# Patient Record
Sex: MALE | Race: BLACK OR AFRICAN AMERICAN | NOT HISPANIC OR LATINO
[De-identification: names, ages, dates, MRNs, and addresses within clinical notes are randomized per-mention and may not be internally consistent; named-entity substitution may affect disease eponyms.]

---

## 2018-01-09 ENCOUNTER — APPOINTMENT (OUTPATIENT)
Dept: SURGERY | Facility: CLINIC | Age: 68
End: 2018-01-09
Payer: MEDICARE

## 2018-01-09 PROCEDURE — 99213 OFFICE O/P EST LOW 20 MIN: CPT

## 2018-02-26 ENCOUNTER — OTHER (OUTPATIENT)
Age: 68
End: 2018-02-26

## 2018-02-27 ENCOUNTER — OTHER (OUTPATIENT)
Age: 68
End: 2018-02-27

## 2018-07-17 ENCOUNTER — APPOINTMENT (OUTPATIENT)
Dept: SURGERY | Facility: CLINIC | Age: 68
End: 2018-07-17
Payer: MEDICARE

## 2018-07-17 PROCEDURE — 99213 OFFICE O/P EST LOW 20 MIN: CPT

## 2019-01-22 ENCOUNTER — APPOINTMENT (OUTPATIENT)
Dept: SURGERY | Facility: CLINIC | Age: 69
End: 2019-01-22
Payer: MEDICARE

## 2019-01-22 DIAGNOSIS — R22.0 LOCALIZED SWELLING, MASS AND LUMP, HEAD: ICD-10-CM

## 2019-01-22 PROCEDURE — 99213 OFFICE O/P EST LOW 20 MIN: CPT

## 2019-01-22 NOTE — PHYSICAL EXAM
[Laryngoscopy Performed] : laryngoscopy was performed, see procedure section for findings [Midline] : located in midline position [Normal] : orientation to person, place, and time: normal [FreeTextEntry2] : 5 cm left cheek mass, well circumscribed and mobile, adjacent to masseter

## 2019-01-22 NOTE — HISTORY OF PRESENT ILLNESS
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  denies pain, drainage, infection, dysphagia,.new lesions  or change in size. recent MRI stable. no changes medically since last visit with exception of bilateral hip replacement

## 2022-06-28 ENCOUNTER — APPOINTMENT (OUTPATIENT)
Dept: UROLOGY | Facility: CLINIC | Age: 72
End: 2022-06-28

## 2022-06-28 VITALS
WEIGHT: 259 LBS | HEIGHT: 71 IN | TEMPERATURE: 98.3 F | DIASTOLIC BLOOD PRESSURE: 84 MMHG | BODY MASS INDEX: 36.26 KG/M2 | HEART RATE: 84 BPM | SYSTOLIC BLOOD PRESSURE: 137 MMHG

## 2022-06-28 DIAGNOSIS — Z78.9 OTHER SPECIFIED HEALTH STATUS: ICD-10-CM

## 2022-06-28 PROCEDURE — 51798 US URINE CAPACITY MEASURE: CPT

## 2022-06-28 PROCEDURE — 99203 OFFICE O/P NEW LOW 30 MIN: CPT

## 2022-06-29 LAB — PSA SERPL-MCNC: 6.9 NG/ML

## 2022-06-29 NOTE — HISTORY OF PRESENT ILLNESS
[FreeTextEntry1] : : 1950 \par Referring Provider: none \par \par HPI: Mr. ANA MARIA FONG is a 72 year yo M with a PMHx notable for elevated PSA. Here today to establish care. No urinary complaints, nocturia x2. Urinary stream is strong. Feels he empties all the way out. No intermittency, but does occasionally feel he has to use the restroom again after voiding. No issues with erections. No history of UTI or hospitalizations related to . \par \par *Prefers me to call cell - under info\par \par Anticoagulation: None\par All: NKDA\par Social: 2 drinks a week,  1/4 ppd x 5 , quit in 70s, , children; worked for [place] [employer]\par PMHx: HTN, gout\par FHx: wife with dementia, no cancer\par PSHx: bilateral THR, L hand tendon surgery\par Labs: PSA up to 5.56 from OSH records\par \par Imaging:\par  [Urinary Incontinence] : no urinary incontinence [Urinary Retention] : no urinary retention [Urinary Urgency] : no urinary urgency [Urinary Frequency] : no urinary frequency

## 2022-06-29 NOTE — PHYSICAL EXAM
[General Appearance - Well Developed] : well developed [General Appearance - Well Nourished] : well nourished [Heart Rate And Rhythm] : Heart rate and rhythm were normal [] : no respiratory distress [Respiration, Rhythm And Depth] : normal respiratory rhythm and effort [Bowel Sounds] : normal bowel sounds [Abdomen Soft] : soft [Normal Station and Gait] : the gait and station were normal for the patient's age [Skin Color & Pigmentation] : normal skin color and pigmentation [Skin Turgor] : supple [No Focal Deficits] : no focal deficits [Oriented To Time, Place, And Person] : oriented to person, place, and time [Not Anxious] : not anxious [FreeTextEntry1] : ERIKA challenging, likely ~30-40g, no nodules

## 2022-06-29 NOTE — REVIEW OF SYSTEMS
[Negative] : Heme/Lymph [see HPI] : see HPI [Strong urge to urinate] : strong urge to urinate [FreeTextEntry2] : 2 x

## 2022-12-20 ENCOUNTER — APPOINTMENT (OUTPATIENT)
Dept: UROLOGY | Facility: CLINIC | Age: 72
End: 2022-12-20

## 2022-12-20 VITALS — DIASTOLIC BLOOD PRESSURE: 80 MMHG | RESPIRATION RATE: 17 BRPM | SYSTOLIC BLOOD PRESSURE: 122 MMHG | HEART RATE: 79 BPM

## 2022-12-20 PROCEDURE — 99213 OFFICE O/P EST LOW 20 MIN: CPT

## 2023-01-06 LAB — PSA SERPL-MCNC: 6.64 NG/ML

## 2023-01-07 ENCOUNTER — RESULT REVIEW (OUTPATIENT)
Age: 73
End: 2023-01-07

## 2023-01-07 ENCOUNTER — APPOINTMENT (OUTPATIENT)
Dept: MRI IMAGING | Facility: IMAGING CENTER | Age: 73
End: 2023-01-07
Payer: MEDICARE

## 2023-01-07 ENCOUNTER — OUTPATIENT (OUTPATIENT)
Dept: OUTPATIENT SERVICES | Facility: HOSPITAL | Age: 73
LOS: 1 days | End: 2023-01-07
Payer: MEDICARE

## 2023-01-07 DIAGNOSIS — R97.20 ELEVATED PROSTATE SPECIFIC ANTIGEN [PSA]: ICD-10-CM

## 2023-01-07 DIAGNOSIS — Z96.60 PRESENCE OF UNSPECIFIED ORTHOPEDIC JOINT IMPLANT: Chronic | ICD-10-CM

## 2023-01-07 PROCEDURE — 72197 MRI PELVIS W/O & W/DYE: CPT

## 2023-01-07 PROCEDURE — 76498P: CUSTOM | Mod: 26,MH

## 2023-01-07 PROCEDURE — A9585: CPT

## 2023-01-07 PROCEDURE — 72197 MRI PELVIS W/O & W/DYE: CPT | Mod: 26,MH

## 2023-01-07 PROCEDURE — 76498 UNLISTED MR PROCEDURE: CPT

## 2023-01-11 NOTE — PHYSICAL EXAM
[General Appearance - Well Developed] : well developed [General Appearance - Well Nourished] : well nourished [Abdomen Soft] : soft [] : no respiratory distress [Respiration, Rhythm And Depth] : normal respiratory rhythm and effort [Oriented To Time, Place, And Person] : oriented to person, place, and time [Not Anxious] : not anxious [Normal Station and Gait] : the gait and station were normal for the patient's age [No Focal Deficits] : no focal deficits [No Palpable Adenopathy] : no palpable adenopathy [Bowel Sounds] : normal bowel sounds [Skin Color & Pigmentation] : normal skin color and pigmentation [Skin Turgor] : supple [Heart Rate And Rhythm] : Heart rate and rhythm were normal [FreeTextEntry1] : ERIKA challenging, likely ~30-40g, no nodules

## 2023-01-11 NOTE — HISTORY OF PRESENT ILLNESS
[Nocturia] : nocturia [FreeTextEntry1] : : 1950 \par Referring Provider: none \par \par HPI: Mr. ANA MARIA FONG is a 72 year yo M with a PMHx notable for elevated PSA. Here today to establish care. No urinary complaints, nocturia x2. Urinary stream is strong. Feels he empties all the way out. No intermittency, but does occasionally feel he has to use the restroom again after voiding. No issues with erections. No history of UTI or hospitalizations related to . \par \par *Prefers me to call cell - under info\par \par Anticoagulation: None\par All: NKDA\par Social: 2 drinks a week, 1/4 ppd x 5 , quit in 70s, , children; worked for [place] [employer]\par PMHx: HTN, gout\par FHx: wife with dementia, no cancer\par PSHx: bilateral THR, L hand tendon surgery\par Labs: PSA up to 5.56 from OSH records\par \par Patient is currently experiencing no urinary incontinence, no urinary retention, no urinary urgency and no urinary frequency. \par  \par \par \par Here today for 6 month follow up and PSA check. Patient reports nocturia x2 related to fluid intake. It is not bothersome and the moment and he would not like any medical intervention at this time. Patient is currently experiencing no urinary incontinence, no urinary retention, no urinary urgency and no urinary frequency. Was supposed to get MRI for PSA of 6.9 in  but message relayed to wife with dementia, patient states he was not aware. Discussed importance of obtaining MRI.  [Urinary Incontinence] : no urinary incontinence [Urinary Retention] : no urinary retention [Urinary Urgency] : no urinary urgency [Urinary Frequency] : no urinary frequency

## 2023-01-12 ENCOUNTER — NON-APPOINTMENT (OUTPATIENT)
Age: 73
End: 2023-01-12

## 2023-01-24 ENCOUNTER — NON-APPOINTMENT (OUTPATIENT)
Age: 73
End: 2023-01-24

## 2023-01-30 ENCOUNTER — NON-APPOINTMENT (OUTPATIENT)
Age: 73
End: 2023-01-30

## 2023-01-31 ENCOUNTER — APPOINTMENT (OUTPATIENT)
Dept: UROLOGY | Facility: CLINIC | Age: 73
End: 2023-01-31
Payer: MEDICARE

## 2023-01-31 ENCOUNTER — OUTPATIENT (OUTPATIENT)
Dept: OUTPATIENT SERVICES | Facility: HOSPITAL | Age: 73
LOS: 1 days | End: 2023-01-31
Payer: MEDICARE

## 2023-01-31 VITALS
HEIGHT: 71 IN | BODY MASS INDEX: 35.98 KG/M2 | WEIGHT: 257 LBS | HEART RATE: 90 BPM | DIASTOLIC BLOOD PRESSURE: 83 MMHG | SYSTOLIC BLOOD PRESSURE: 149 MMHG

## 2023-01-31 DIAGNOSIS — R35.0 FREQUENCY OF MICTURITION: ICD-10-CM

## 2023-01-31 DIAGNOSIS — Z96.60 PRESENCE OF UNSPECIFIED ORTHOPEDIC JOINT IMPLANT: Chronic | ICD-10-CM

## 2023-01-31 PROCEDURE — 55700: CPT

## 2023-01-31 PROCEDURE — 76377 3D RENDER W/INTRP POSTPROCES: CPT | Mod: 26

## 2023-02-03 ENCOUNTER — APPOINTMENT (OUTPATIENT)
Dept: OTOLARYNGOLOGY | Facility: CLINIC | Age: 73
End: 2023-02-03
Payer: MEDICARE

## 2023-02-03 ENCOUNTER — NON-APPOINTMENT (OUTPATIENT)
Age: 73
End: 2023-02-03

## 2023-02-03 VITALS
SYSTOLIC BLOOD PRESSURE: 147 MMHG | TEMPERATURE: 97.9 F | HEART RATE: 87 BPM | HEIGHT: 70 IN | BODY MASS INDEX: 36.79 KG/M2 | WEIGHT: 257 LBS | DIASTOLIC BLOOD PRESSURE: 86 MMHG

## 2023-02-03 DIAGNOSIS — R97.20 ELEVATED PROSTATE SPECIFIC ANTIGEN [PSA]: ICD-10-CM

## 2023-02-03 DIAGNOSIS — H93.292 OTHER ABNORMAL AUDITORY PERCEPTIONS, LEFT EAR: ICD-10-CM

## 2023-02-03 PROCEDURE — 69210 REMOVE IMPACTED EAR WAX UNI: CPT

## 2023-02-03 PROCEDURE — 92567 TYMPANOMETRY: CPT

## 2023-02-03 PROCEDURE — 99203 OFFICE O/P NEW LOW 30 MIN: CPT | Mod: 25

## 2023-02-03 PROCEDURE — 92557 COMPREHENSIVE HEARING TEST: CPT

## 2023-02-03 NOTE — CONSULT LETTER
[Dear  ___] : Dear  [unfilled], [Consult Letter:] : I had the pleasure of evaluating your patient, [unfilled]. [Please see my note below.] : Please see my note below. [Consult Closing:] : Thank you very much for allowing me to participate in the care of this patient.  If you have any questions, please do not hesitate to contact me. [Sincerely,] : Sincerely, [FreeTextEntry3] : Hallie Varela MD\par Otolaryngology and Cranial Base Surgery\par Attending Physician - Department of Otolaryngology and Head & Neck Surgery\par [facility]\par  - [employer] [facility]\par Office: [phone]\par Fax: [phone]\par

## 2023-02-03 NOTE — HISTORY OF PRESENT ILLNESS
[de-identified] : 71 y/o M, notes one month ago had decreased hearing in the left ear.  No tinnitus or vertigo.  No pain.  Used cerumen remover gtts and notes had mild improvement.

## 2023-02-03 NOTE — END OF VISIT
[FreeTextEntry3] : I personally saw and examined Mr. ANA MARIA FONG in detail this visit today. I personally reviewed the HPI, PMH, FH, SH, ROS and medications/allergies. I have spoken to SILVANO Cornelius regarding the history and have personally determined the assessment and plan of care, and documented this myself. I was present and participated in all key portions of the encounter and all procedures noted above. I have made changes in the body of the note where appropriate.\par \par Attesting Faculty: See Attending Signature Below

## 2023-02-13 ENCOUNTER — TRANSCRIPTION ENCOUNTER (OUTPATIENT)
Age: 73
End: 2023-02-13

## 2023-02-16 ENCOUNTER — TRANSCRIPTION ENCOUNTER (OUTPATIENT)
Age: 73
End: 2023-02-16

## 2023-02-16 LAB — PROSTATE BIOPSY: NORMAL

## 2023-02-17 ENCOUNTER — TRANSCRIPTION ENCOUNTER (OUTPATIENT)
Age: 73
End: 2023-02-17

## 2023-02-21 ENCOUNTER — APPOINTMENT (OUTPATIENT)
Dept: UROLOGY | Facility: CLINIC | Age: 73
End: 2023-02-21
Payer: MEDICARE

## 2023-02-21 PROCEDURE — 99213 OFFICE O/P EST LOW 20 MIN: CPT

## 2023-02-21 NOTE — HISTORY OF PRESENT ILLNESS
[FreeTextEntry1] : : 1950 \par Referring Provider: none \par \par HPI: Mr. ANA MARIA FONG is a 72 year yo M with a PMHx notable for elevated PSA. Here today to establish care. No urinary complaints, nocturia x2. Urinary stream is strong. Feels he empties all the way out. No intermittency, but does occasionally feel he has to use the restroom again after voiding. No issues with erections. No history of UTI or hospitalizations related to . \par \par *Prefers me to call cell - under info\par \par Anticoagulation: None\par All: NKDA\par Social: 2 drinks a week, 1/4 ppd x 5 , quit in 70s, , children; worked for [place] [employer]\par PMHx: HTN, gout\par FHx: wife with dementia, no cancer\par PSHx: bilateral THR, L hand tendon surgery\par Labs: PSA up to 5.56 from OSH records\par \par Patient is currently experiencing no urinary incontinence, no urinary retention, no urinary urgency and no urinary frequency. \par  \par \par \par Here today for 6 month follow up and PSA check. Patient reports nocturia x2 related to fluid intake. It is not bothersome and the moment and he would not like any medical intervention at this time. Patient is currently experiencing no urinary incontinence, no urinary retention, no urinary urgency and no urinary frequency. Was supposed to get MRI for PSA of 6.9 in  but message relayed to wife with dementia, patient states he was not aware. Discussed importance of obtaining MRI. \par \par :\par Here today with stable nocturia. Prostate biopsy with 1 core 3+4 30% Pattern 4 disease. Discussed benefits including AS, XRT and surgery. He agrees that he would like to observe for now. No other symptoms at this time. No weight loss.  [Urinary Incontinence] : no urinary incontinence [Urinary Retention] : no urinary retention [Urinary Urgency] : no urinary urgency [Urinary Frequency] : no urinary frequency [Nocturia] : no nocturia

## 2023-05-23 ENCOUNTER — APPOINTMENT (OUTPATIENT)
Dept: UROLOGY | Facility: CLINIC | Age: 73
End: 2023-05-23
Payer: MEDICARE

## 2023-05-23 PROCEDURE — 99213 OFFICE O/P EST LOW 20 MIN: CPT

## 2023-05-27 LAB — PSA SERPL-MCNC: 7.77 NG/ML

## 2023-05-27 NOTE — ASSESSMENT
[FreeTextEntry1] : 74 yo M with BPH and CaP with GG2 disease, stable, due for MRI prostate in 3 months

## 2023-05-27 NOTE — HISTORY OF PRESENT ILLNESS
[FreeTextEntry1] : : 1950 \par Referring Provider: none \par \par HPI: Mr. ANA MARIA FONG is a 72 year yo M with a PMHx notable for elevated PSA. Here today to establish care. No urinary complaints, nocturia x2. Urinary stream is strong. Feels he empties all the way out. No intermittency, but does occasionally feel he has to use the restroom again after voiding. No issues with erections. No history of UTI or hospitalizations related to . \par \par *Prefers me to call cell - under info\par \par Anticoagulation: None\par All: NKDA\par Social: 2 drinks a week, 1/4 ppd x 5 , quit in 70s, , children; worked for [place] [employer]\par PMHx: HTN, gout\par FHx: wife with dementia, no cancer\par PSHx: bilateral THR, L hand tendon surgery\par Labs: PSA up to 5.56 from OSH records\par \par Patient is currently experiencing no urinary incontinence, no urinary retention, no urinary urgency and no urinary frequency. \par  \par \par \par Here today for 6 month follow up and PSA check. Patient reports nocturia x2 related to fluid intake. It is not bothersome and the moment and he would not like any medical intervention at this time. Patient is currently experiencing no urinary incontinence, no urinary retention, no urinary urgency and no urinary frequency. Was supposed to get MRI for PSA of 6.9 in  but message relayed to wife with dementia, patient states he was not aware. Discussed importance of obtaining MRI. \par \par :\par Here today with stable nocturia. Prostate biopsy with 1 core 3+4 30% Pattern 4 disease. Discussed benefits including AS, XRT and surgery. He agrees that he would like to observe for now. No other symptoms at this time. No weight loss. \par \par :\par Here today with 1 core GG2 CaP and 30% Pattern 4 disease. Discussed he would to continue AS at this time. Pending PSA Today, due for MRI in 3 months. No new symptoms, wife with worsening dementia.  [Urinary Incontinence] : no urinary incontinence [Urinary Retention] : no urinary retention [Urinary Urgency] : no urinary urgency [Urinary Frequency] : no urinary frequency [Nocturia] : no nocturia

## 2023-05-27 NOTE — PHYSICAL EXAM
[General Appearance - Well Developed] : well developed [General Appearance - Well Nourished] : well nourished [Bowel Sounds] : normal bowel sounds [Abdomen Soft] : soft [Skin Color & Pigmentation] : normal skin color and pigmentation [Skin Turgor] : supple [Heart Rate And Rhythm] : Heart rate and rhythm were normal [] : no respiratory distress [Respiration, Rhythm And Depth] : normal respiratory rhythm and effort [Oriented To Time, Place, And Person] : oriented to person, place, and time [Not Anxious] : not anxious [Normal Station and Gait] : the gait and station were normal for the patient's age [No Focal Deficits] : no focal deficits [No Palpable Adenopathy] : no palpable adenopathy [FreeTextEntry1] : ERIKA challenging, likely ~30-40g, no nodules

## 2023-08-09 ENCOUNTER — APPOINTMENT (OUTPATIENT)
Dept: MRI IMAGING | Facility: IMAGING CENTER | Age: 73
End: 2023-08-09

## 2023-09-01 NOTE — ASSESSMENT
Problem: Infection  Goal: Absence of Infection Signs and Symptoms  Outcome: Ongoing, Progressing     Problem: Adult Inpatient Plan of Care  Goal: Plan of Care Review  Outcome: Ongoing, Progressing  Goal: Patient-Specific Goal (Individualized)  Outcome: Ongoing, Progressing  Goal: Absence of Hospital-Acquired Illness or Injury  Outcome: Ongoing, Progressing  Goal: Optimal Comfort and Wellbeing  Outcome: Ongoing, Progressing  Goal: Readiness for Transition of Care  Outcome: Ongoing, Progressing      [FreeTextEntry1] : Abnormal auditory perception of left ear, mild cerumen:\par - Cerumen removed in office today\par - Audiogram today with mild HF SNHL\par - f/u yearly cerumen and audio

## 2023-09-06 ENCOUNTER — RESULT REVIEW (OUTPATIENT)
Age: 73
End: 2023-09-06

## 2023-09-06 ENCOUNTER — APPOINTMENT (OUTPATIENT)
Dept: MRI IMAGING | Facility: CLINIC | Age: 73
End: 2023-09-06
Payer: MEDICARE

## 2023-09-06 ENCOUNTER — OUTPATIENT (OUTPATIENT)
Dept: OUTPATIENT SERVICES | Facility: HOSPITAL | Age: 73
LOS: 1 days | End: 2023-09-06
Payer: MEDICARE

## 2023-09-06 DIAGNOSIS — R97.20 ELEVATED PROSTATE SPECIFIC ANTIGEN [PSA]: ICD-10-CM

## 2023-09-06 DIAGNOSIS — Z96.60 PRESENCE OF UNSPECIFIED ORTHOPEDIC JOINT IMPLANT: Chronic | ICD-10-CM

## 2023-09-06 PROCEDURE — 76498P: CUSTOM | Mod: 26,MH

## 2023-09-06 PROCEDURE — 72197 MRI PELVIS W/O & W/DYE: CPT

## 2023-09-06 PROCEDURE — 76498 UNLISTED MR PROCEDURE: CPT

## 2023-09-06 PROCEDURE — 72197 MRI PELVIS W/O & W/DYE: CPT | Mod: 26,MH

## 2023-09-06 PROCEDURE — A9585: CPT

## 2023-09-12 ENCOUNTER — APPOINTMENT (OUTPATIENT)
Dept: UROLOGY | Facility: CLINIC | Age: 73
End: 2023-09-12
Payer: MEDICARE

## 2023-09-12 PROCEDURE — 99213 OFFICE O/P EST LOW 20 MIN: CPT | Mod: 95

## 2023-09-17 ENCOUNTER — TRANSCRIPTION ENCOUNTER (OUTPATIENT)
Age: 73
End: 2023-09-17

## 2023-12-12 ENCOUNTER — APPOINTMENT (OUTPATIENT)
Dept: UROLOGY | Facility: CLINIC | Age: 73
End: 2023-12-12
Payer: MEDICARE

## 2023-12-12 VITALS
RESPIRATION RATE: 16 BRPM | TEMPERATURE: 98.2 F | DIASTOLIC BLOOD PRESSURE: 75 MMHG | SYSTOLIC BLOOD PRESSURE: 138 MMHG | HEART RATE: 76 BPM

## 2023-12-12 PROCEDURE — 99213 OFFICE O/P EST LOW 20 MIN: CPT

## 2023-12-31 ENCOUNTER — TRANSCRIPTION ENCOUNTER (OUTPATIENT)
Age: 73
End: 2023-12-31

## 2023-12-31 LAB — PSA SERPL-MCNC: 7.34 NG/ML

## 2024-01-25 ENCOUNTER — APPOINTMENT (OUTPATIENT)
Dept: UROLOGY | Facility: CLINIC | Age: 74
End: 2024-01-25
Payer: MEDICARE

## 2024-01-25 ENCOUNTER — OUTPATIENT (OUTPATIENT)
Dept: OUTPATIENT SERVICES | Facility: HOSPITAL | Age: 74
LOS: 1 days | End: 2024-01-25
Payer: MEDICARE

## 2024-01-25 VITALS — DIASTOLIC BLOOD PRESSURE: 83 MMHG | HEART RATE: 90 BPM | SYSTOLIC BLOOD PRESSURE: 123 MMHG

## 2024-01-25 VITALS — SYSTOLIC BLOOD PRESSURE: 162 MMHG | HEART RATE: 84 BPM | DIASTOLIC BLOOD PRESSURE: 90 MMHG

## 2024-01-25 DIAGNOSIS — R35.0 FREQUENCY OF MICTURITION: ICD-10-CM

## 2024-01-25 DIAGNOSIS — Z96.60 PRESENCE OF UNSPECIFIED ORTHOPEDIC JOINT IMPLANT: Chronic | ICD-10-CM

## 2024-01-25 PROCEDURE — 55700: CPT

## 2024-01-26 DIAGNOSIS — R97.20 ELEVATED PROSTATE SPECIFIC ANTIGEN [PSA]: ICD-10-CM

## 2024-02-12 ENCOUNTER — APPOINTMENT (OUTPATIENT)
Dept: UROLOGY | Facility: CLINIC | Age: 74
End: 2024-02-12
Payer: MEDICARE

## 2024-02-12 VITALS — DIASTOLIC BLOOD PRESSURE: 76 MMHG | SYSTOLIC BLOOD PRESSURE: 130 MMHG | HEART RATE: 88 BPM

## 2024-02-12 DIAGNOSIS — R97.20 ELEVATED PROSTATE, SPECIFIC ANTIGEN [PSA]: ICD-10-CM

## 2024-02-12 LAB — PROSTATE BIOPSY: NORMAL

## 2024-02-12 PROCEDURE — 99215 OFFICE O/P EST HI 40 MIN: CPT

## 2024-02-12 NOTE — ASSESSMENT
[FreeTextEntry1] : 74 yo M with prostate cancer, interested in XRT  - Plan for referral to Dr. Smith for XRT therapy

## 2024-02-12 NOTE — HISTORY OF PRESENT ILLNESS
[FreeTextEntry1] : : 1950  Referring Provider: none   HPI: Mr. ANA MARIA FONG is a 72 year yo M with a PMHx notable for elevated PSA. Here today to establish care. No urinary complaints, nocturia x2. Urinary stream is strong. Feels he empties all the way out. No intermittency, but does occasionally feel he has to use the restroom again after voiding. No issues with erections. No history of UTI or hospitalizations related to .   *Prefers me to call cell - under info  Anticoagulation: None All: NKDA Social: 2 drinks a week, 1/4 ppd x 5 , quit in 70s, , children; worked for [place] [employer] PMHx: HTN, gout FHx: wife with dementia, no cancer PSHx: bilateral THR, L hand tendon surgery Labs: PSA up to 5.56 from OSH records  Patient is currently experiencing no urinary incontinence, no urinary retention, no urinary urgency and no urinary frequency.      Here today for 6 month follow up and PSA check. Patient reports nocturia x2 related to fluid intake. It is not bothersome and the moment and he would not like any medical intervention at this time. Patient is currently experiencing no urinary incontinence, no urinary retention, no urinary urgency and no urinary frequency. Was supposed to get MRI for PSA of 6.9 in  but message relayed to wife with dementia, patient states he was not aware. Discussed importance of obtaining MRI.   : Here today with stable nocturia. Prostate biopsy with 1 core 3+4 30% Pattern 4 disease. Discussed benefits including AS, XRT and surgery. He agrees that he would like to observe for now. No other symptoms at this time. No weight loss.   : Here today with 1 core GG2 CaP and 30% Pattern 4 disease. Discussed he would to continue AS at this time. Pending PSA Today, due for MRI in 3 months. No new symptoms, wife with worsening dementia.   : Verbal consent given on 2023 at 17:38 by ANA MARIA FONG. Telehealth visit today on [facility]. History of recent health, disease symptoms and mediation review completed. Limited exam but on video able to view limited exam of mucous membranes, skin, demonstration of joint range of motion, gain. No medication side effects noted. Discussed lab testing. He had a PSA recently done with this PCP was 6.3 ng/mL - he will send a copy of the report. MRI without obvious lesions. 1 core of GG2 CaP, 30% Pattern 4 disease.   : Here today as he is on AS for his prostate cancer. 1 core of 3+4 = 7 on 2023 biopsy. Given his age and current health, we have continued patient on active surveillance. He has had two recently rising PSAs >7, repeating his PSA Today. Per AS protocol, he is set for repeat prostate biopsy next month. September MRI  was negative for obvious lesions.   : Here today to disucss prostate cancer diagnosis, now has a second core of GG1 and GG2 CaP. Today we discussed the natural history of localized prostate cancer, and the heterogeneous biology of this malignancy. We discussed the fact that many prostate cancers are slow growing and unlikely to metastasize or cause death, whereas others can be life threatening. We reviewed risk stratification, staging, Angela scoring, and PSA levels as they pertain to risk.   All treatment options were reviewed. This included active surveillance, androgen deprivation, emerging options such as focal therapy/HIFU/cryotherapy, radiation options (including IMRT, SBRT, brachytherapy) and surgical options (open vs. robotic surgery, nerve vs. non-nerve sparing). Oncologic outcomes were compared and contrasted. Risks of biochemical and clinical recurrence discussed. Risks of needing adjuvant or salvage treatments reviewed. We discussed the risks of secondary malignancy after radiation. We discussed the opportunity for pathological staging with surgery vs. other options. We discussed the possibility of positive margins, treatment failure, cancer recurrence and cancer-related death even with treatment.   All potential side effects of treatment were reviewed including, but not limited to: short term or permanent stress urinary incontinence and/or short term or permanent erectile dysfunction, penile shortening, rectal symptoms/pain, perineal pain, and other side effects.   All potential complications of treatment and surgery were reviewed including, but not limited to: risks of conversion from MIS to open surgery discussed, bleeding//life-threatening hemorrhage, rectal injury requiring colostomy or delayed recognition leading to fistula, vascular/bowel/adjacent visceral organ injury, trocar/access injury, the possibility of recognized vs. unrecognized/delayed-recognition injury, risks of thermal/blunt/sharp/retraction injury, risks of DVT, PE, MI, death, risks of cardiopulmonary/anesthesia related complications, positional injury, infection/collection/abscess, wound complications/dehiscence/seroma/cellulitis, urinoma/fistula, ureteral injury/obstruction, bladder neck contracture, penile shortening, meatal stenosis, urethral stricture, lymphocele, obturator nerve injury, prolonged anastomotic leak, and other complications.  He would like to proceed with XRT for his prostate cancer.  [Urinary Incontinence] : no urinary incontinence [Urinary Retention] : no urinary retention [Urinary Urgency] : no urinary urgency [Urinary Frequency] : no urinary frequency

## 2024-02-12 NOTE — PHYSICAL EXAM
[General Appearance - Well Developed] : well developed [Heart Rate And Rhythm] : heart rate and rhythm were normal [] : no respiratory distress [Respiration, Rhythm And Depth] : normal respiratory rhythm and effort [Bowel Sounds] : normal bowel sounds [Abdomen Soft] : soft [Skin Color & Pigmentation] : normal skin color and pigmentation [Skin Turgor] : supple [No Focal Deficits] : no focal deficits

## 2024-02-22 LAB — BACTERIA UR CULT: NORMAL

## 2024-02-25 ENCOUNTER — NON-APPOINTMENT (OUTPATIENT)
Age: 74
End: 2024-02-25

## 2024-02-26 ENCOUNTER — NON-APPOINTMENT (OUTPATIENT)
Age: 74
End: 2024-02-26

## 2024-02-26 ENCOUNTER — APPOINTMENT (OUTPATIENT)
Dept: RADIATION ONCOLOGY | Facility: CLINIC | Age: 74
End: 2024-02-26
Payer: MEDICARE

## 2024-02-26 VITALS
BODY MASS INDEX: 39.14 KG/M2 | WEIGHT: 273.37 LBS | RESPIRATION RATE: 18 BRPM | DIASTOLIC BLOOD PRESSURE: 91 MMHG | SYSTOLIC BLOOD PRESSURE: 158 MMHG | HEIGHT: 70 IN | OXYGEN SATURATION: 95 % | TEMPERATURE: 96.4 F | HEART RATE: 90 BPM

## 2024-02-26 PROCEDURE — 99204 OFFICE O/P NEW MOD 45 MIN: CPT

## 2024-02-26 RX ORDER — IBUPROFEN 200 MG/1
CAPSULE, LIQUID FILLED ORAL
Refills: 0 | Status: ACTIVE | COMMUNITY

## 2024-02-26 RX ORDER — HYDRALAZINE HYDROCHLORIDE 25 MG/1
25 TABLET ORAL
Refills: 0 | Status: ACTIVE | COMMUNITY

## 2024-02-26 RX ORDER — ALBUTEROL 90 MCG
AEROSOL (GRAM) INHALATION
Refills: 0 | Status: ACTIVE | COMMUNITY

## 2024-02-26 NOTE — DISEASE MANAGEMENT
[c] : c [IIB] : IIB [BiopsyDate] : 01/24 [TotalCores] : 2 [MeasuredProstateVolume] : 35 mL [TotalPositiveCores] : 12 [MaxCoreInvolvement] : 15

## 2024-02-26 NOTE — VITALS
[Maximal Pain Intensity: 8/10] : 8/10 [Least Pain Intensity: 0/10] : 0/10 [Pain Duration: ___] : Pain duration: [unfilled] [Pain Location: ___] : Pain Location: [unfilled] [80: Normal activity with effort; some signs or symptoms of disease.] : 80: Normal activity with effort; some signs or symptoms of disease.  [OTC] : OTC [Pain Interferes with ADLs] : Pain does not interfere with activities of daily living

## 2024-02-26 NOTE — REVIEW OF SYSTEMS
[Nocturia] : nocturia [IPSS Score (0-40): ___] : IPSS score: [unfilled] [EPIC-CP Score (0-60): ___] : EPIC-CP score: [unfilled] [Joint Pain] : joint pain [Negative] : Allergic/Immunologic [FreeTextEntry9] : osteoarthritis

## 2024-02-26 NOTE — PHYSICAL EXAM
[Outer Ear] : the ears and nose were normal in appearance [Sclera] : the sclera and conjunctiva were normal [Abdomen Tenderness] : non-tender [No Focal Deficits] : no focal deficits [] : no rash [Normal] : oriented to person, place and time, the affect was normal, the mood was normal and not anxious [Abdomen Soft] : soft [Nondistended] : nondistended [Heart Rate And Rhythm] : heart rate and rhythm were normal

## 2024-02-26 NOTE — HISTORY OF PRESENT ILLNESS
[FreeTextEntry1] : Mr. Olmedo is a 74-year-old male with prostate cancer, referred for radiotherapy recommendations. He is accompanied by his daughter.  PSA screening showed an elevated PSA of 5.90 on 6/28/22 and 6.64 on 12/20/22.   Prostate MRI on 1/7/23 showed a 35 mL prostate volume and a lesion in the right posterior medial base to mid-gland peripheral zone measuring up to 5 mm, PIRADS 3. There was no extracapsular extension. There was no seminal vesicle invasion and the neurovascular bundles were unremarkable. There was no pelvic adenopathy and no suspicious osseous lesions.   Prostate biopsy on 1/31/23 showed 1 out of 13 cores with adenocarcinoma from the left anterior, Angela 3+4=7 involving 10% of a single core with 30% Angela 4 pattern.   He proceeded with active surveillance. PSA was 7.77 on 5/23/23 and 7.34 on 12/12/23. Prostate MRI on 9/6/23 showed a 38 cc prostate volume, central hypertrophy, and no MRI targetable lesions. Bilateral hip replacements with artifact were noted.   Prostate biopsy on 1/25/24 showed adenocarcinoma from the left posterior lateral base with Zebulon score 3+4=7 involving 15% of a single core, 10% Zebulon 4 pattern. Adenocarcinoma with Angela score 3+3=6 involved 5% of 1 core from the left anterior. The remaining cores were benign.  He is feeling generally well. He denies urinary incontinence, urinary retention, urinary urgency and urinary frequency.  He has nocturia x3 but also reports drinking a lot of fluids. He currently taking hydralazine 25mg tab, ibuprofen daily as needed for arthritis, Allopurinol 300mg, Amlodipine 10mg daily, Albuterol inhaler as needed.

## 2024-04-19 ENCOUNTER — APPOINTMENT (OUTPATIENT)
Dept: OTOLARYNGOLOGY | Facility: CLINIC | Age: 74
End: 2024-04-19
Payer: MEDICARE

## 2024-04-19 VITALS — HEART RATE: 84 BPM | TEMPERATURE: 98 F | SYSTOLIC BLOOD PRESSURE: 135 MMHG | DIASTOLIC BLOOD PRESSURE: 75 MMHG

## 2024-04-19 DIAGNOSIS — H61.23 IMPACTED CERUMEN, BILATERAL: ICD-10-CM

## 2024-04-19 DIAGNOSIS — H93.8X3 OTHER SPECIFIED DISORDERS OF EAR, BILATERAL: ICD-10-CM

## 2024-04-19 PROCEDURE — 99213 OFFICE O/P EST LOW 20 MIN: CPT | Mod: 25

## 2024-04-19 PROCEDURE — 69210 REMOVE IMPACTED EAR WAX UNI: CPT

## 2024-06-11 ENCOUNTER — APPOINTMENT (OUTPATIENT)
Dept: UROLOGY | Facility: CLINIC | Age: 74
End: 2024-06-11
Payer: MEDICARE

## 2024-06-11 VITALS
OXYGEN SATURATION: 96 % | DIASTOLIC BLOOD PRESSURE: 84 MMHG | SYSTOLIC BLOOD PRESSURE: 135 MMHG | RESPIRATION RATE: 16 BRPM | HEART RATE: 80 BPM

## 2024-06-11 DIAGNOSIS — R39.9 UNSPECIFIED SYMPTOMS AND SIGNS INVOLVING THE GENITOURINARY SYSTEM: ICD-10-CM

## 2024-06-11 DIAGNOSIS — R39.198 OTHER DIFFICULTIES WITH MICTURITION: ICD-10-CM

## 2024-06-11 PROCEDURE — 99213 OFFICE O/P EST LOW 20 MIN: CPT

## 2024-06-11 RX ORDER — TAMSULOSIN HYDROCHLORIDE 0.4 MG/1
0.4 CAPSULE ORAL
Qty: 90 | Refills: 1 | Status: ACTIVE | COMMUNITY
Start: 2024-06-11 | End: 1900-01-01

## 2024-06-11 NOTE — HISTORY OF PRESENT ILLNESS
[Weak Stream] : weak stream [FreeTextEntry1] : : 1950  Referring Provider: none   HPI: Mr. ANA MARIA FONG is a 72 year yo M with a PMHx notable for elevated PSA. Here today to establish care. No urinary complaints, nocturia x2. Urinary stream is strong. Feels he empties all the way out. No intermittency, but does occasionally feel he has to use the restroom again after voiding. No issues with erections. No history of UTI or hospitalizations related to .   *Prefers me to call cell - under info  Anticoagulation: None All: NKDA Social: 2 drinks a week, 1/4 ppd x 5 , quit in 70s, , children; worked for [place] [employer] PMHx: HTN, gout FHx: wife with dementia, no cancer PSHx: bilateral THR, L hand tendon surgery Labs: PSA up to 5.56 from OSH records  Patient is currently experiencing no urinary incontinence, no urinary retention, no urinary urgency and no urinary frequency.      Here today for 6 month follow up and PSA check. Patient reports nocturia x2 related to fluid intake. It is not bothersome and the moment and he would not like any medical intervention at this time. Patient is currently experiencing no urinary incontinence, no urinary retention, no urinary urgency and no urinary frequency. Was supposed to get MRI for PSA of 6.9 in  but message relayed to wife with dementia, patient states he was not aware. Discussed importance of obtaining MRI.   : Here today with stable nocturia. Prostate biopsy with 1 core 3+4 30% Pattern 4 disease. Discussed benefits including AS, XRT and surgery. He agrees that he would like to observe for now. No other symptoms at this time. No weight loss.   : Here today with 1 core GG2 CaP and 30% Pattern 4 disease. Discussed he would to continue AS at this time. Pending PSA Today, due for MRI in 3 months. No new symptoms, wife with worsening dementia.   : Verbal consent given on 2023 at 17:38 by ANA MARIA FONG. Telehealth visit today on [facility]. History of recent health, disease symptoms and mediation review completed. Limited exam but on video able to view limited exam of mucous membranes, skin, demonstration of joint range of motion, gain. No medication side effects noted. Discussed lab testing. He had a PSA recently done with this PCP was 6.3 ng/mL - he will send a copy of the report. MRI without obvious lesions. 1 core of GG2 CaP, 30% Pattern 4 disease.   : Here today as he is on AS for his prostate cancer. 1 core of 3+4 = 7 on 2023 biopsy. Given his age and current health, we have continued patient on active surveillance. He has had two recently rising PSAs >7, repeating his PSA Today. Per AS protocol, he is set for repeat prostate biopsy next month. September MRI  was negative for obvious lesions.   : Here today to disucss prostate cancer diagnosis, now has a second core of GG1 and GG2 CaP. Today we discussed the natural history of localized prostate cancer, and the heterogeneous biology of this malignancy. We discussed the fact that many prostate cancers are slow growing and unlikely to metastasize or cause death, whereas others can be life threatening. We reviewed risk stratification, staging, Angela scoring, and PSA levels as they pertain to risk.   All treatment options were reviewed. This included active surveillance, androgen deprivation, emerging options such as focal therapy/HIFU/cryotherapy, radiation options (including IMRT, SBRT, brachytherapy) and surgical options (open vs. robotic surgery, nerve vs. non-nerve sparing). Oncologic outcomes were compared and contrasted. Risks of biochemical and clinical recurrence discussed. Risks of needing adjuvant or salvage treatments reviewed. We discussed the risks of secondary malignancy after radiation. We discussed the opportunity for pathological staging with surgery vs. other options. We discussed the possibility of positive margins, treatment failure, cancer recurrence and cancer-related death even with treatment.   All potential side effects of treatment were reviewed including, but not limited to: short term or permanent stress urinary incontinence and/or short term or permanent erectile dysfunction, penile shortening, rectal symptoms/pain, perineal pain, and other side effects.   All potential complications of treatment and surgery were reviewed including, but not limited to: risks of conversion from MIS to open surgery discussed, bleeding//life-threatening hemorrhage, rectal injury requiring colostomy or delayed recognition leading to fistula, vascular/bowel/adjacent visceral organ injury, trocar/access injury, the possibility of recognized vs. unrecognized/delayed-recognition injury, risks of thermal/blunt/sharp/retraction injury, risks of DVT, PE, MI, death, risks of cardiopulmonary/anesthesia related complications, positional injury, infection/collection/abscess, wound complications/dehiscence/seroma/cellulitis, urinoma/fistula, ureteral injury/obstruction, bladder neck contracture, penile shortening, meatal stenosis, urethral stricture, lymphocele, obturator nerve injury, prolonged anastomotic leak, and other complications.  He would like to proceed with XRT for his prostate cancer.   24 Pt presents for f/u - seeing Dr. Smith for XRT- pt starting SBRT later this month/in the Fall. Pt reports having weak urinary stream- willing to try Tamsulosin - educated on side effects. Aidan check PSA today.  AE d/w patient.   [Urinary Incontinence] : no urinary incontinence [Urinary Retention] : no urinary retention [Urinary Urgency] : no urinary urgency [Urinary Frequency] : no urinary frequency [Straining] : no straining [Intermittency] : no intermittency [Post-Void Dribbling] : no post-void dribbling [Dysuria] : no dysuria [Hematuria - Gross] : no gross hematuria

## 2024-06-11 NOTE — ASSESSMENT
[FreeTextEntry1] : 75 yo M with CaP and LUTS with slow urinary stream  #CaP - Electing to have XRT start in the fall - Plan to have PSA today - We discussed the fact that PSA is a nonspecific test for cancer although it is prostate specific. We have reviewed the fact that elevations can be caused by multiple separate processes with the prostate including prostate cancer, benign prostate enlargement, prostate inflammation or infection, or combination of any of the above. We also reviewed that procedures involving catheterizations or manipulation of the prostate including colonoscopy could cause PSA to be elevated. I also have reviewed with the patient that there is age specificity related to PSA and that over time there is some expectation that the PSA will slowly rise over time  #LUTS - Start tamsulosin, AE d/w patient

## 2024-06-16 ENCOUNTER — TRANSCRIPTION ENCOUNTER (OUTPATIENT)
Age: 74
End: 2024-06-16

## 2024-06-16 LAB — PSA SERPL-MCNC: 7.9 NG/ML

## 2024-06-17 ENCOUNTER — APPOINTMENT (OUTPATIENT)
Dept: RADIATION ONCOLOGY | Facility: CLINIC | Age: 74
End: 2024-06-17
Payer: MEDICARE

## 2024-06-17 VITALS
WEIGHT: 276.23 LBS | DIASTOLIC BLOOD PRESSURE: 79 MMHG | HEIGHT: 70 IN | OXYGEN SATURATION: 97 % | HEART RATE: 91 BPM | TEMPERATURE: 97 F | SYSTOLIC BLOOD PRESSURE: 127 MMHG | RESPIRATION RATE: 17 BRPM | BODY MASS INDEX: 39.55 KG/M2

## 2024-06-17 DIAGNOSIS — C61 MALIGNANT NEOPLASM OF PROSTATE: ICD-10-CM

## 2024-06-17 PROCEDURE — 99212 OFFICE O/P EST SF 10 MIN: CPT | Mod: GC

## 2024-06-22 PROBLEM — C61 PROSTATE CANCER: Status: ACTIVE | Noted: 2023-02-21

## 2024-06-22 NOTE — ASSESSMENT
[No evidence of disease] : No evidence of disease [History reviewed] : History reviewed. [Medications and Allergies reviewed] : Medications and allergies reviewed.

## 2024-06-22 NOTE — HISTORY OF PRESENT ILLNESS
[FreeTextEntry1] : Mr. Olmedo is a 74-year-old male with prognostic group IIB adenocarcinoma of the prostate with Elida score 3+4=7 and pre-treatment PSA <10 ng/mL. He has been on active surveillance since 2023 with minimal change found on second biopsy in 2024. He was seen for initial consultation on 2/26/24 and has continued active surveillance. He returns today for re-evaluation.    PSA screening showed an elevated PSA of 5.90 on 6/28/22 and 6.64 on 12/20/22.  Prostate MRI on 1/7/23 showed a 35 mL prostate volume and a lesion in the right posterior medial base to mid-gland peripheral zone measuring up to 5 mm, PIRADS 3. There was no extracapsular extension. There was no seminal vesicle invasion and the neurovascular bundles were unremarkable. There was no pelvic adenopathy and no suspicious osseous lesions.  Prostate biopsy on 1/31/23 showed 1 out of 13 cores with adenocarcinoma from the left anterior, Elida 3+4=7 involving 10% of a single core with 30% Elida 4 pattern.  He proceeded with active surveillance. PSA was 7.77 on 5/23/23 and 7.34 on 12/12/23. Prostate MRI on 9/6/23 showed a 38 cc prostate volume, central hypertrophy, and no MRI targetable lesions. Bilateral hip replacements with artifact were noted.  Prostate biopsy on 1/25/24 showed adenocarcinoma from the left posterior lateral base with Elida score 3+4=7 involving 15% of a single core, 10% Elida 4 pattern. Adenocarcinoma with Angela score 3+3=6 involved 5% of 1 core from the left anterior. The remaining cores were benign.  PSA on 6/11/24 was 7.90 ng/mL.   He is feeling generally well. He denies urinary incontinence, urinary retention, urinary urgency and urinary frequency. He has nocturia x3 but also reports drinking a lot of fluids. He started taking Flomax a few days ago and noted an improvement in weak urinary stream. He will soon be going on a cruise. He has a wedding to attend afterwards. He has been considering doing radiation therapy in the fall.

## 2024-06-22 NOTE — REVIEW OF SYSTEMS
[Joint Pain] : joint pain [Negative] : Allergic/Immunologic [Nocturia] : nocturia [IPSS Score (0-40): ___] : IPSS score: [unfilled] [EPIC-CP Score (0-60): ___] : EPIC-CP score: [unfilled] [FreeTextEntry9] : osteoarthritis

## 2024-06-22 NOTE — DISEASE MANAGEMENT
[1] : T1 [c] : c [0] : M0 [0-10] : 0 -10 ng/mL [Biopsy with Fusion] : Patient had a biopsy with fusion on [7(3+4)] : Fusion Biopsy [place] Score: 7(3+4) [] : Patient had a Prostate MRI [3] : 3 [IIB] : IIB [BiopsyDate] : 01/24 [TotalCores] : 2 [MeasuredProstateVolume] : 35 mL [TotalPositiveCores] : 12 [MaxCoreInvolvement] : 15

## 2024-06-22 NOTE — VITALS
[Maximal Pain Intensity: 8/10] : 8/10 [Least Pain Intensity: 0/10] : 0/10 [Pain Duration: ___] : Pain duration: [unfilled] [Pain Location: ___] : Pain Location: [unfilled] [OTC] : OTC [80: Normal activity with effort; some signs or symptoms of disease.] : 80: Normal activity with effort; some signs or symptoms of disease.  [Pain Interferes with ADLs] : Pain does not interfere with activities of daily living

## 2024-06-22 NOTE — PHYSICAL EXAM
[Sclera] : the sclera and conjunctiva were normal [Extraocular Movements] : extraocular movements were intact [] : no respiratory distress [Exaggerated Use Of Accessory Muscles For Inspiration] : no accessory muscle use [Skin Color & Pigmentation] : normal skin color and pigmentation [Oriented To Time, Place, And Person] : oriented to person, place, and time [Heart Rate And Rhythm] : heart rate and rhythm were normal [Abdomen Soft] : soft [Nondistended] : nondistended [Normal] : no focal deficits

## 2024-10-15 ENCOUNTER — APPOINTMENT (OUTPATIENT)
Dept: UROLOGY | Facility: CLINIC | Age: 74
End: 2024-10-15
Payer: MEDICARE

## 2024-10-15 VITALS — DIASTOLIC BLOOD PRESSURE: 74 MMHG | SYSTOLIC BLOOD PRESSURE: 146 MMHG | HEART RATE: 91 BPM

## 2024-10-15 DIAGNOSIS — C61 MALIGNANT NEOPLASM OF PROSTATE: ICD-10-CM

## 2024-10-15 DIAGNOSIS — R97.20 ELEVATED PROSTATE, SPECIFIC ANTIGEN [PSA]: ICD-10-CM

## 2024-10-15 PROCEDURE — 99213 OFFICE O/P EST LOW 20 MIN: CPT

## 2024-10-22 ENCOUNTER — NON-APPOINTMENT (OUTPATIENT)
Age: 74
End: 2024-10-22

## 2024-10-22 LAB
PSA FREE FLD-MCNC: 13 %
PSA FREE SERPL-MCNC: 0.87 NG/ML
PSA SERPL-MCNC: 6.7 NG/ML

## 2025-01-14 ENCOUNTER — APPOINTMENT (OUTPATIENT)
Dept: UROLOGY | Facility: CLINIC | Age: 75
End: 2025-01-14
Payer: MEDICARE

## 2025-01-14 VITALS
OXYGEN SATURATION: 96 % | RESPIRATION RATE: 15 BRPM | DIASTOLIC BLOOD PRESSURE: 89 MMHG | SYSTOLIC BLOOD PRESSURE: 159 MMHG | HEART RATE: 96 BPM

## 2025-01-14 DIAGNOSIS — C61 MALIGNANT NEOPLASM OF PROSTATE: ICD-10-CM

## 2025-01-14 PROCEDURE — 99213 OFFICE O/P EST LOW 20 MIN: CPT

## 2025-01-17 ENCOUNTER — APPOINTMENT (OUTPATIENT)
Dept: OTOLARYNGOLOGY | Facility: CLINIC | Age: 75
End: 2025-01-17
Payer: MEDICARE

## 2025-01-17 VITALS
HEART RATE: 82 BPM | BODY MASS INDEX: 39.51 KG/M2 | SYSTOLIC BLOOD PRESSURE: 129 MMHG | WEIGHT: 276 LBS | HEIGHT: 70 IN | TEMPERATURE: 98.1 F | DIASTOLIC BLOOD PRESSURE: 81 MMHG

## 2025-01-17 DIAGNOSIS — H93.292 OTHER ABNORMAL AUDITORY PERCEPTIONS, LEFT EAR: ICD-10-CM

## 2025-01-17 DIAGNOSIS — H61.23 IMPACTED CERUMEN, BILATERAL: ICD-10-CM

## 2025-01-17 DIAGNOSIS — H93.8X3 OTHER SPECIFIED DISORDERS OF EAR, BILATERAL: ICD-10-CM

## 2025-01-17 PROCEDURE — 92567 TYMPANOMETRY: CPT

## 2025-01-17 PROCEDURE — 99213 OFFICE O/P EST LOW 20 MIN: CPT | Mod: 25

## 2025-01-17 PROCEDURE — 92557 COMPREHENSIVE HEARING TEST: CPT

## 2025-01-17 PROCEDURE — 69210 REMOVE IMPACTED EAR WAX UNI: CPT

## 2025-01-17 RX ORDER — NEOMYCIN AND POLYMYXIN B SULFATES AND HYDROCORTISONE OTIC 10; 3.5; 1 MG/ML; MG/ML; [USP'U]/ML
3.5-10000-1 SUSPENSION AURICULAR (OTIC)
Qty: 10 | Refills: 0 | Status: ACTIVE | COMMUNITY
Start: 2024-12-18

## 2025-01-17 RX ORDER — AMLODIPINE BESYLATE 10 MG/1
10 TABLET ORAL
Qty: 90 | Refills: 0 | Status: ACTIVE | COMMUNITY
Start: 2025-01-13

## 2025-01-17 RX ORDER — FLUOCINONIDE 0.5 MG/G
0.05 CREAM TOPICAL
Qty: 60 | Refills: 0 | Status: ACTIVE | COMMUNITY
Start: 2024-12-18

## 2025-01-17 RX ORDER — CHLORHEXIDINE GLUCONATE, 0.12% ORAL RINSE 1.2 MG/ML
0.12 SOLUTION DENTAL
Qty: 473 | Refills: 0 | Status: ACTIVE | COMMUNITY
Start: 2024-11-12

## 2025-01-19 LAB — PSA SERPL-MCNC: 7.76 NG/ML

## 2025-01-23 ENCOUNTER — OUTPATIENT (OUTPATIENT)
Dept: OUTPATIENT SERVICES | Facility: HOSPITAL | Age: 75
LOS: 1 days | Discharge: HOME | End: 2025-01-23
Payer: MEDICARE

## 2025-01-23 DIAGNOSIS — Z96.60 PRESENCE OF UNSPECIFIED ORTHOPEDIC JOINT IMPLANT: Chronic | ICD-10-CM

## 2025-01-23 RX ORDER — AMOXICILLIN AND CLAVULANATE POTASSIUM 875; 125 MG/1; MG/1
875-125 TABLET, COATED ORAL
Qty: 6 | Refills: 0 | Status: ACTIVE | COMMUNITY
Start: 2025-01-23 | End: 1900-01-01

## 2025-01-23 RX ORDER — AMOXICILLIN 500 MG/1
500 CAPSULE ORAL
Qty: 21 | Refills: 0 | Status: DISCONTINUED | COMMUNITY
Start: 2024-11-12 | End: 2025-01-23

## 2025-02-12 DIAGNOSIS — C61 MALIGNANT NEOPLASM OF PROSTATE: ICD-10-CM

## 2025-03-06 ENCOUNTER — NON-APPOINTMENT (OUTPATIENT)
Age: 75
End: 2025-03-06

## 2025-03-06 DIAGNOSIS — C61 MALIGNANT NEOPLASM OF PROSTATE: ICD-10-CM

## 2025-03-06 PROCEDURE — 55874 TPRNL PLMT BIODEGRDABL MATRL: CPT

## 2025-03-06 PROCEDURE — 55876 PLACE RT DEVICE/MARKER PROS: CPT

## 2025-03-06 PROCEDURE — 76872 US TRANSRECTAL: CPT | Mod: 26

## 2025-03-07 ENCOUNTER — NON-APPOINTMENT (OUTPATIENT)
Age: 75
End: 2025-03-07

## 2025-03-13 ENCOUNTER — NON-APPOINTMENT (OUTPATIENT)
Age: 75
End: 2025-03-13

## 2025-03-13 PROCEDURE — 77263 THER RADIOLOGY TX PLNG CPLX: CPT

## 2025-03-13 PROCEDURE — 77334 RADIATION TREATMENT AID(S): CPT | Mod: 26

## 2025-03-19 PROCEDURE — 77300 RADIATION THERAPY DOSE PLAN: CPT | Mod: 26

## 2025-03-19 PROCEDURE — 77338 DESIGN MLC DEVICE FOR IMRT: CPT | Mod: 26

## 2025-03-19 PROCEDURE — 77301 RADIOTHERAPY DOSE PLAN IMRT: CPT | Mod: 26

## 2025-04-03 ENCOUNTER — NON-APPOINTMENT (OUTPATIENT)
Age: 75
End: 2025-04-03

## 2025-04-09 ENCOUNTER — NON-APPOINTMENT (OUTPATIENT)
Age: 75
End: 2025-04-09

## 2025-04-09 PROCEDURE — 77435 SBRT MANAGEMENT: CPT

## 2025-04-17 ENCOUNTER — NON-APPOINTMENT (OUTPATIENT)
Age: 75
End: 2025-04-17

## 2025-05-12 ENCOUNTER — APPOINTMENT (OUTPATIENT)
Dept: RADIATION ONCOLOGY | Facility: CLINIC | Age: 75
End: 2025-05-12
Payer: MEDICARE

## 2025-05-12 VITALS
OXYGEN SATURATION: 97 % | DIASTOLIC BLOOD PRESSURE: 83 MMHG | HEIGHT: 70 IN | SYSTOLIC BLOOD PRESSURE: 161 MMHG | RESPIRATION RATE: 16 BRPM | TEMPERATURE: 96.98 F | HEART RATE: 84 BPM

## 2025-05-12 DIAGNOSIS — C61 MALIGNANT NEOPLASM OF PROSTATE: ICD-10-CM

## 2025-05-12 PROCEDURE — 99212 OFFICE O/P EST SF 10 MIN: CPT

## 2025-05-20 ENCOUNTER — APPOINTMENT (OUTPATIENT)
Dept: UROLOGY | Facility: CLINIC | Age: 75
End: 2025-05-20

## 2025-08-13 ENCOUNTER — NON-APPOINTMENT (OUTPATIENT)
Age: 75
End: 2025-08-13

## 2025-08-13 ENCOUNTER — APPOINTMENT (OUTPATIENT)
Dept: RADIATION ONCOLOGY | Facility: CLINIC | Age: 75
End: 2025-08-13
Payer: MEDICARE

## 2025-08-13 VITALS
SYSTOLIC BLOOD PRESSURE: 124 MMHG | OXYGEN SATURATION: 97 % | RESPIRATION RATE: 16 BRPM | TEMPERATURE: 96.8 F | WEIGHT: 268.19 LBS | HEART RATE: 86 BPM | BODY MASS INDEX: 38.48 KG/M2 | DIASTOLIC BLOOD PRESSURE: 75 MMHG

## 2025-08-13 DIAGNOSIS — C61 MALIGNANT NEOPLASM OF PROSTATE: ICD-10-CM

## 2025-08-13 DIAGNOSIS — K46.9 UNSPECIFIED ABDOMINAL HERNIA W/OUT OBSTRUCTION OR GANGRENE: ICD-10-CM

## 2025-08-13 PROCEDURE — 99212 OFFICE O/P EST SF 10 MIN: CPT

## 2025-08-21 ENCOUNTER — OUTPATIENT (OUTPATIENT)
Dept: OUTPATIENT SERVICES | Facility: HOSPITAL | Age: 75
LOS: 1 days | End: 2025-08-21
Payer: MEDICARE

## 2025-08-21 ENCOUNTER — APPOINTMENT (OUTPATIENT)
Dept: CT IMAGING | Facility: IMAGING CENTER | Age: 75
End: 2025-08-21

## 2025-08-21 DIAGNOSIS — Z96.60 PRESENCE OF UNSPECIFIED ORTHOPEDIC JOINT IMPLANT: Chronic | ICD-10-CM

## 2025-08-21 PROCEDURE — 74176 CT ABD & PELVIS W/O CONTRAST: CPT | Mod: 26

## 2025-08-21 PROCEDURE — 74176 CT ABD & PELVIS W/O CONTRAST: CPT
